# Patient Record
Sex: FEMALE | Race: BLACK OR AFRICAN AMERICAN | HISPANIC OR LATINO
[De-identification: names, ages, dates, MRNs, and addresses within clinical notes are randomized per-mention and may not be internally consistent; named-entity substitution may affect disease eponyms.]

---

## 2021-10-20 PROBLEM — Z00.00 ENCOUNTER FOR PREVENTIVE HEALTH EXAMINATION: Status: ACTIVE | Noted: 2021-10-20

## 2022-03-24 ENCOUNTER — TRANSCRIPTION ENCOUNTER (OUTPATIENT)
Age: 87
End: 2022-03-24

## 2022-08-23 ENCOUNTER — EMERGENCY (EMERGENCY)
Facility: HOSPITAL | Age: 87
LOS: 0 days | Discharge: HOME | End: 2022-08-24
Attending: STUDENT IN AN ORGANIZED HEALTH CARE EDUCATION/TRAINING PROGRAM | Admitting: INTERNAL MEDICINE

## 2022-08-23 VITALS
HEART RATE: 61 BPM | WEIGHT: 153 LBS | SYSTOLIC BLOOD PRESSURE: 129 MMHG | HEIGHT: 66 IN | RESPIRATION RATE: 18 BRPM | DIASTOLIC BLOOD PRESSURE: 78 MMHG | OXYGEN SATURATION: 99 % | TEMPERATURE: 99 F

## 2022-08-23 LAB
ABO RH CONFIRMATION: SIGNIFICANT CHANGE UP
ALBUMIN SERPL ELPH-MCNC: 3.5 G/DL — SIGNIFICANT CHANGE UP (ref 3.3–5)
ALP SERPL-CCNC: 56 U/L — SIGNIFICANT CHANGE UP (ref 40–120)
ALT FLD-CCNC: 19 U/L — SIGNIFICANT CHANGE UP (ref 12–78)
ANION GAP SERPL CALC-SCNC: 8 MMOL/L — SIGNIFICANT CHANGE UP (ref 5–17)
ANISOCYTOSIS BLD QL: SLIGHT — SIGNIFICANT CHANGE UP
APTT BLD: 35.2 SEC — SIGNIFICANT CHANGE UP (ref 27.5–35.5)
AST SERPL-CCNC: 44 U/L — HIGH (ref 15–37)
BASOPHILS # BLD AUTO: 0.02 K/UL — SIGNIFICANT CHANGE UP (ref 0–0.2)
BASOPHILS NFR BLD AUTO: 0.3 % — SIGNIFICANT CHANGE UP (ref 0–2)
BILIRUB SERPL-MCNC: 0.6 MG/DL — SIGNIFICANT CHANGE UP (ref 0.2–1.2)
BLD GP AB SCN SERPL QL: SIGNIFICANT CHANGE UP
BUN SERPL-MCNC: 23 MG/DL — SIGNIFICANT CHANGE UP (ref 7–23)
CALCIUM SERPL-MCNC: 8.9 MG/DL — SIGNIFICANT CHANGE UP (ref 8.5–10.1)
CHLORIDE SERPL-SCNC: 108 MMOL/L — SIGNIFICANT CHANGE UP (ref 96–108)
CO2 SERPL-SCNC: 27 MMOL/L — SIGNIFICANT CHANGE UP (ref 22–31)
CREAT SERPL-MCNC: 1.39 MG/DL — HIGH (ref 0.5–1.3)
EGFR: 36 ML/MIN/1.73M2 — LOW
ELLIPTOCYTES BLD QL SMEAR: SLIGHT — SIGNIFICANT CHANGE UP
EOSINOPHIL # BLD AUTO: 0.02 K/UL — SIGNIFICANT CHANGE UP (ref 0–0.5)
EOSINOPHIL NFR BLD AUTO: 0.3 % — SIGNIFICANT CHANGE UP (ref 0–6)
FLUAV AG NPH QL: SIGNIFICANT CHANGE UP
FLUBV AG NPH QL: SIGNIFICANT CHANGE UP
GLUCOSE BLDC GLUCOMTR-MCNC: 136 MG/DL — HIGH (ref 70–99)
GLUCOSE SERPL-MCNC: 97 MG/DL — SIGNIFICANT CHANGE UP (ref 70–99)
HCT VFR BLD CALC: 22.5 % — LOW (ref 34.5–45)
HGB BLD-MCNC: 6.5 G/DL — CRITICAL LOW (ref 11.5–15.5)
HYPOCHROMIA BLD QL: SIGNIFICANT CHANGE UP
IMM GRANULOCYTES NFR BLD AUTO: 0.5 % — SIGNIFICANT CHANGE UP (ref 0–1.5)
INR BLD: 2.91 RATIO — HIGH (ref 0.88–1.16)
LYMPHOCYTES # BLD AUTO: 1.35 K/UL — SIGNIFICANT CHANGE UP (ref 1–3.3)
LYMPHOCYTES # BLD AUTO: 22.6 % — SIGNIFICANT CHANGE UP (ref 13–44)
MANUAL SMEAR VERIFICATION: YES — SIGNIFICANT CHANGE UP
MCHC RBC-ENTMCNC: 18.7 PG — LOW (ref 27–34)
MCHC RBC-ENTMCNC: 28.9 G/DL — LOW (ref 32–36)
MCV RBC AUTO: 64.8 FL — LOW (ref 80–100)
MONOCYTES # BLD AUTO: 0.79 K/UL — SIGNIFICANT CHANGE UP (ref 0–0.9)
MONOCYTES NFR BLD AUTO: 13.2 % — SIGNIFICANT CHANGE UP (ref 2–14)
NEUTROPHILS # BLD AUTO: 3.77 K/UL — SIGNIFICANT CHANGE UP (ref 1.8–7.4)
NEUTROPHILS NFR BLD AUTO: 63.1 % — SIGNIFICANT CHANGE UP (ref 43–77)
NRBC # BLD: 0 /100 WBCS — SIGNIFICANT CHANGE UP (ref 0–0)
OB PNL STL: POSITIVE
PLAT MORPH BLD: NORMAL — SIGNIFICANT CHANGE UP
PLATELET # BLD AUTO: 220 K/UL — SIGNIFICANT CHANGE UP (ref 150–400)
POTASSIUM SERPL-MCNC: 4.8 MMOL/L — SIGNIFICANT CHANGE UP (ref 3.5–5.3)
POTASSIUM SERPL-SCNC: 4.8 MMOL/L — SIGNIFICANT CHANGE UP (ref 3.5–5.3)
PROT SERPL-MCNC: 7.3 GM/DL — SIGNIFICANT CHANGE UP (ref 6–8.3)
PROTHROM AB SERPL-ACNC: 35.3 SEC — HIGH (ref 10.5–13.4)
RBC # BLD: 3.47 M/UL — LOW (ref 3.8–5.2)
RBC # FLD: 20.2 % — HIGH (ref 10.3–14.5)
RBC BLD AUTO: ABNORMAL
SARS-COV-2 RNA SPEC QL NAA+PROBE: SIGNIFICANT CHANGE UP
SODIUM SERPL-SCNC: 143 MMOL/L — SIGNIFICANT CHANGE UP (ref 135–145)
TARGETS BLD QL SMEAR: SLIGHT — SIGNIFICANT CHANGE UP
WBC # BLD: 5.98 K/UL — SIGNIFICANT CHANGE UP (ref 3.8–10.5)
WBC # FLD AUTO: 5.98 K/UL — SIGNIFICANT CHANGE UP (ref 3.8–10.5)

## 2022-08-23 PROCEDURE — 74176 CT ABD & PELVIS W/O CONTRAST: CPT | Mod: 26,MA

## 2022-08-23 PROCEDURE — 93010 ELECTROCARDIOGRAM REPORT: CPT

## 2022-08-23 PROCEDURE — 71250 CT THORAX DX C-: CPT | Mod: 26

## 2022-08-23 PROCEDURE — 99285 EMERGENCY DEPT VISIT HI MDM: CPT

## 2022-08-23 PROCEDURE — 99223 1ST HOSP IP/OBS HIGH 75: CPT

## 2022-08-23 RX ORDER — ONDANSETRON 8 MG/1
4 TABLET, FILM COATED ORAL EVERY 8 HOURS
Refills: 0 | Status: DISCONTINUED | OUTPATIENT
Start: 2022-08-23 | End: 2022-08-24

## 2022-08-23 RX ORDER — DEXTROSE 50 % IN WATER 50 %
25 SYRINGE (ML) INTRAVENOUS ONCE
Refills: 0 | Status: DISCONTINUED | OUTPATIENT
Start: 2022-08-23 | End: 2022-08-24

## 2022-08-23 RX ORDER — INSULIN LISPRO 100/ML
VIAL (ML) SUBCUTANEOUS
Refills: 0 | Status: DISCONTINUED | OUTPATIENT
Start: 2022-08-23 | End: 2022-08-24

## 2022-08-23 RX ORDER — DEXTROSE 50 % IN WATER 50 %
12.5 SYRINGE (ML) INTRAVENOUS ONCE
Refills: 0 | Status: DISCONTINUED | OUTPATIENT
Start: 2022-08-23 | End: 2022-08-24

## 2022-08-23 RX ORDER — ASPIRIN/CALCIUM CARB/MAGNESIUM 324 MG
1 TABLET ORAL
Qty: 0 | Refills: 0 | DISCHARGE

## 2022-08-23 RX ORDER — METOPROLOL TARTRATE 50 MG
0 TABLET ORAL
Qty: 0 | Refills: 0 | DISCHARGE

## 2022-08-23 RX ORDER — LINAGLIPTIN 5 MG/1
1 TABLET, FILM COATED ORAL
Qty: 0 | Refills: 0 | DISCHARGE

## 2022-08-23 RX ORDER — DEXTROSE 50 % IN WATER 50 %
15 SYRINGE (ML) INTRAVENOUS ONCE
Refills: 0 | Status: DISCONTINUED | OUTPATIENT
Start: 2022-08-23 | End: 2022-08-24

## 2022-08-23 RX ORDER — LANOLIN ALCOHOL/MO/W.PET/CERES
3 CREAM (GRAM) TOPICAL AT BEDTIME
Refills: 0 | Status: DISCONTINUED | OUTPATIENT
Start: 2022-08-23 | End: 2022-08-24

## 2022-08-23 RX ORDER — CINACALCET 30 MG/1
1 TABLET, FILM COATED ORAL
Qty: 0 | Refills: 0 | DISCHARGE

## 2022-08-23 RX ORDER — ASPIRIN/CALCIUM CARB/MAGNESIUM 324 MG
81 TABLET ORAL DAILY
Refills: 0 | Status: DISCONTINUED | OUTPATIENT
Start: 2022-08-24 | End: 2022-08-23

## 2022-08-23 RX ORDER — CINACALCET 30 MG/1
30 TABLET, FILM COATED ORAL DAILY
Refills: 0 | Status: DISCONTINUED | OUTPATIENT
Start: 2022-08-24 | End: 2022-08-24

## 2022-08-23 RX ORDER — MAGNESIUM OXIDE 400 MG ORAL TABLET 241.3 MG
1 TABLET ORAL
Qty: 0 | Refills: 0 | DISCHARGE

## 2022-08-23 RX ORDER — METOPROLOL TARTRATE 50 MG
1 TABLET ORAL
Qty: 0 | Refills: 0 | DISCHARGE

## 2022-08-23 RX ORDER — MAGNESIUM OXIDE 400 MG ORAL TABLET 241.3 MG
400 TABLET ORAL DAILY
Refills: 0 | Status: DISCONTINUED | OUTPATIENT
Start: 2022-08-23 | End: 2022-08-24

## 2022-08-23 RX ORDER — FUROSEMIDE 40 MG
0 TABLET ORAL
Qty: 0 | Refills: 0 | DISCHARGE

## 2022-08-23 RX ORDER — MAGNESIUM OXIDE 400 MG ORAL TABLET 241.3 MG
0 TABLET ORAL
Qty: 0 | Refills: 0 | DISCHARGE

## 2022-08-23 RX ORDER — PANTOPRAZOLE SODIUM 20 MG/1
40 TABLET, DELAYED RELEASE ORAL DAILY
Refills: 0 | Status: DISCONTINUED | OUTPATIENT
Start: 2022-08-23 | End: 2022-08-24

## 2022-08-23 RX ORDER — SODIUM CHLORIDE 9 MG/ML
1000 INJECTION, SOLUTION INTRAVENOUS
Refills: 0 | Status: DISCONTINUED | OUTPATIENT
Start: 2022-08-23 | End: 2022-08-24

## 2022-08-23 RX ORDER — METOPROLOL TARTRATE 50 MG
25 TABLET ORAL
Refills: 0 | Status: DISCONTINUED | OUTPATIENT
Start: 2022-08-23 | End: 2022-08-24

## 2022-08-23 RX ORDER — RIVAROXABAN 15 MG-20MG
15 KIT ORAL
Refills: 0 | Status: DISCONTINUED | OUTPATIENT
Start: 2022-08-23 | End: 2022-08-23

## 2022-08-23 RX ORDER — CINACALCET 30 MG/1
0 TABLET, FILM COATED ORAL
Qty: 0 | Refills: 0 | DISCHARGE

## 2022-08-23 RX ORDER — GLUCAGON INJECTION, SOLUTION 0.5 MG/.1ML
1 INJECTION, SOLUTION SUBCUTANEOUS ONCE
Refills: 0 | Status: DISCONTINUED | OUTPATIENT
Start: 2022-08-23 | End: 2022-08-24

## 2022-08-23 RX ORDER — FONDAPARINUX SODIUM 2.5 MG/.5ML
1 INJECTION, SOLUTION SUBCUTANEOUS
Qty: 0 | Refills: 0 | DISCHARGE

## 2022-08-23 RX ORDER — INSULIN LISPRO 100/ML
VIAL (ML) SUBCUTANEOUS AT BEDTIME
Refills: 0 | Status: DISCONTINUED | OUTPATIENT
Start: 2022-08-23 | End: 2022-08-24

## 2022-08-23 RX ORDER — RIVAROXABAN 15 MG-20MG
0 KIT ORAL
Qty: 0 | Refills: 0 | DISCHARGE

## 2022-08-23 RX ORDER — ACETAMINOPHEN 500 MG
650 TABLET ORAL EVERY 6 HOURS
Refills: 0 | Status: DISCONTINUED | OUTPATIENT
Start: 2022-08-23 | End: 2022-08-24

## 2022-08-23 RX ORDER — LINAGLIPTIN 5 MG/1
0 TABLET, FILM COATED ORAL
Qty: 0 | Refills: 0 | DISCHARGE

## 2022-08-23 NOTE — ED ADULT NURSE NOTE - NSIMPLEMENTINTERV_GEN_ALL_ED
Implemented All Fall with Harm Risk Interventions:  [place] to call system. Call bell, personal items and telephone within reach. Instruct patient to call for assistance. Room bathroom lighting operational. Non-slip footwear when patient is off stretcher. Physically safe environment: no spills, clutter or unnecessary equipment. Stretcher in lowest position, wheels locked, appropriate side rails in place. Provide visual cue, wrist band, yellow gown, etc. Monitor gait and stability. Monitor for mental status changes and reorient to person, place, and time. Review medications for side effects contributing to fall risk. Reinforce activity limits and safety measures with patient and family. Provide visual clues: red socks.

## 2022-08-23 NOTE — ED ADULT NURSE NOTE - OBJECTIVE STATEMENT
as per patient's daughter " she got blood work done yesterday and today it resulted her blood count is low 5.8." [ Denies chest pain, back pain, admits to on and off mild sob at rest]

## 2022-08-23 NOTE — ED PROVIDER NOTE - CONSTITUTIONAL, MLM
Well appearing, awake, alert, oriented to person, place, time/situation, non lethargic appearing normal...

## 2022-08-23 NOTE — H&P ADULT - NSICDXPASTMEDICALHX_GEN_ALL_CORE_FT
PAST MEDICAL HISTORY:  Atrial fibrillation On Xarelto    Diabetes DM-2    H/O thyroid disease     Hypertension     Pacemaker

## 2022-08-23 NOTE — PATIENT PROFILE ADULT - FALL HARM RISK - HARM RISK INTERVENTIONS
Assistance with ambulation/Assistance OOB with selected safe patient handling equipment/Communicate Risk of Fall with Harm to all staff/Discuss with provider need for PT consult/Monitor gait and stability/Provide patient with walking aids - walker, cane, crutches/Reinforce activity limits and safety measures with patient and family/Tailored Fall Risk Interventions/Use of alarms - bed, chair and/or voice tab/Visual Cue: Yellow wristband and red socks/Bed in lowest position, wheels locked, appropriate side rails in place/Call bell, personal items and telephone in reach/Instruct patient to call for assistance before getting out of bed or chair/Non-slip footwear when patient is out of bed/[place] to call system/Physically safe environment - no spills, clutter or unnecessary equipment/Purposeful Proactive Rounding/Room/bathroom lighting operational, light cord in reach

## 2022-08-23 NOTE — H&P ADULT - NSHPLABSRESULTS_GEN_ALL_CORE
LABS:                        6.5    5.98  )-----------( 220      ( 23 Aug 2022 13:00 )             22.5     08-23    143  |  108  |  23  ----------------------------<  97  4.8   |  27  |  1.39<H>    Ca    8.9      23 Aug 2022 13:00    TPro  7.3  /  Alb  3.5  /  TBili  0.6  /  DBili  x   /  AST  44<H>  /  ALT  19  /  AlkPhos  56  08-23    PT/INR - ( 23 Aug 2022 13:00 )   PT: 35.3 sec;   INR: 2.91 ratio         PTT - ( 23 Aug 2022 13:00 )  PTT:35.2 sec.        CT Chest No Cont (08.23.22 @ 17:41) >      IMPRESSION: Minimal patchy ground-glass opacities and interlobular septal   thickening in the right upper lobe are noted. Finding likely represents   mild asymmetric pulmonary edema.    Two nodular opacities are noted in the lingula and the right lower lobe   as described above. Etiology is unclear. Differential diagnosis includes   infection and/or malignancy. Follow-up CT scan is recommended in one   month to differentiate between the two possibilities.            CT Abdomen and Pelvis No Cont (08.23.22 @ 15:35) >    IMPRESSION: Lobular right lower lobe semisolid lesion, worrisome for   malignancy. Recommend dedicated CT of the thorax for further evaluation.    < end of copied text >

## 2022-08-23 NOTE — ED ADULT NURSE NOTE - NSICDXPASTMEDICALHX_GEN_ALL_CORE_FT
PAST MEDICAL HISTORY:  Atrial fibrillation     Diabetes     H/O thyroid disease     Hypertension     Pacemaker

## 2022-08-23 NOTE — PATIENT PROFILE ADULT - NSTRANSFERBELONGINGSRESP_GEN_A_NUR
Duration Of Freeze Thaw-Cycle (Seconds): 5
Post-Care Instructions: I reviewed with the patient in detail post-care instructions. Patient is to wear sunprotection, and avoid picking at any of the treated lesions. Pt may apply Vaseline to crusted or scabbing areas.
Render Post-Care Instructions In Note?: no
Number Of Freeze-Thaw Cycles: 1 freeze-thaw cycle
Detail Level: Detailed
Consent: The patient's consent was obtained including but not limited to risks of crusting, scabbing, blistering, scarring, darker or lighter pigmentary change, recurrence, incomplete removal and infection.
Medical Necessity Information: It is in your best interest to select a reason for this procedure from the list below. All of these items fulfill various CMS LCD requirements except the new and changing color options.
Medical Necessity Clause: This procedure was medically necessary because the lesions that were treated were:
yes

## 2022-08-23 NOTE — H&P ADULT - NSHPPHYSICALEXAM_GEN_ALL_CORE
PHYSICAL EXAM:  GENERAL: NAD, lying in bed comfortably  HEAD:  Atraumatic, Normocephalic  EYES: EOMI, PERRLA, conjunctiva and sclera clear  ENT: Moist mucous membranes  NECK: Supple, No JVD  CHEST/LUNG: Clear to auscultation bilaterally; No rales, rhonchi, wheezing, or rubs. Unlabored respirations  HEART: Regular rate and rhythm; No murmurs, rubs, or gallops  ABDOMEN: Bowel sounds present; Soft, Nontender, Nondistended. No hepatomegaly  EXTREMITIES:  2+ Peripheral Pulses, brisk capillary refill. No clubbing or cyanosis. 1+ pitting edema present.  NERVOUS SYSTEM:  Alert & Oriented X3, speech clear. No deficits   MSK: FROM all 4 extremities, full and equal strength  SKIN: No rashes or lesions

## 2022-08-23 NOTE — ED ADULT NURSE NOTE - ED STAT RN HANDOFF DETAILS
Report given to receiving KEREN Ontiveros from [facility] ,pts history, current condition and reason for admission discussed, safety concerns addressed and reviewed, pt currently in stable condition, IV flushes for patency and site shows no signs or symptoms of infiltrate, dressing is clean dry and intact, pt is aware of plan of care. Awaiting for transport.

## 2022-08-23 NOTE — ED ADULT TRIAGE NOTE - CHIEF COMPLAINT QUOTE
pt sent to ED by pmd for hgb- 5.8, lab done yesterday. pt also c/o intermittent sob started last night. denies sob at this time. hx: afib, htn, chf

## 2022-08-23 NOTE — ED PROVIDER NOTE - OBJECTIVE STATEMENT
89 year old female with h/o HTN, DM, PPM and a-fib (on Xarelto) presents today accompanied with her daughter sent by her PMD for anemia, pt had routine bloodwork done yesterday, pt denies chest pain, dizziness/lightheadedness, pt does reports mild sob at rest, intermittent palpitations

## 2022-08-23 NOTE — H&P ADULT - HISTORY OF PRESENT ILLNESS
Chief Complaint: abnormal lab result with low Hb form PCP.  Patient is 89 year old female with PMH of HTN, DM-2, PPM and A-fib (on Xarelto), CHF and others has been referred to ED by her PCP with low Hb for further evaluation and management. Patient is being accompanied by her daughter. Patient had routine blood work done yesterday with her PCP and her Hb was 6.0. Patient feels tired, week and has exertional dyspnea but denies dizziness/lightheadedness or chest pain. She admits that she had some dark stool lately, but denies seeing blood in her stool or on wipes. She also denies hematemesis or hemoptysis or hematuria. She is on ASA and Xarelto, but not on NSAIDS. She is non-alcoholic.  Hb here in ED is 6.5, and 1 unit PRBC transfusion ordered in ED. Otherwise, she denies fever, chills, vomiting, abdominal pain, diarrhea, constipation or dysuria.    Other significant labs: INR 2.91, MCV 64, RDW 20, Cr 1.39, BUN, 23.    CT Abdomen/Pelvis: unremarkable except lobular 14 x 9 mm RLL semisolid lesion in right lung, worrisome for malignancy.  CT chest: 1.1 cm nodular opacity RLL, f/o CT in 1 month to re-assess.

## 2022-08-23 NOTE — H&P ADULT - ASSESSMENT
Chief Complaint: abnormal lab result with low Hb form PCP.  Patient is 89 year old female with PMH of HTN, DM-2, PPM and A-fib (on Xarelto), CHF and others has been referred to ED by her PCP with low Hb for further evaluation and management. Patient is being accompanied by her daughter. Patient had routine blood work done yesterday with her PCP and her Hb was 6.0. Patient feels tired, week and has exertional dyspnea but denies dizziness/lightheadedness or chest pain. She admits that she had some dark stool lately, but denies seeing blood in her stool or on wipes. She also denies hematemesis or hemoptysis or hematuria. She is on ASA and Xarelto, but not on NSAIDS. She is non-alcoholic.  Hb here in ED is 6.5, and 1 unit PRBC transfusion ordered in ED. Otherwise, she denies fever, chills, vomiting, abdominal pain, diarrhea, constipation or dysuria.    Other significant labs: INR 2.91, MCV 64, RDW 20, Cr 1.39, BUN, 23.    CT Abdomen/Pelvis: unremarkable except lobular 14 x 9 mm RLL semisolid lesion in right lung, worrisome for malignancy.  CT chest: 1.1 cm nodular opacity RLL, f/o CT in 1 month to re-assess.      1. Hypochromic microcytic severe anemia likely upper GI bleed.   She has noted dark stool lately, and her stool is guaiac heme positive. No active ongoing bleeding. CT abd/pelvis unremarkable.  Admit to telemetry.  Iron panel, Ferritin, B12, Folate and CEA level.  Transfuse 1 PRBC which has been ordered in ER.  Check CBC in am.  Protonix 40 mg IV daily.  Hold her ASA and Xarelto for now.  Consult GI in am.    2. RLL 1.1 cm pulmonary nodule per CT chest.  Incidental finding, but worrisome for malignancy.  Follow-up repeat CT chest in 1 month to re-evaluate in co-ordination with her PCP.    3. RON.  Cr 1.39, probably due to GI bleed.  Gentle hydration.  BMP in am.    4. HTN, P.A. fib, PPM in place and h/o CHF.  Stable and no active issue.  Continue her Metoprolol and Lasix.  ASA and Xarelto on hold due to GI bleed.    5. DM-2.  BS level is 97  A1c level.  ISS with coverage.  Accu-checks q AC & HS.    DVT prophylaxis: SCDs.    Plan of care d/w the patient and her daughter at bedside, and they verbalized understanding.           Chief Complaint: abnormal lab result with low Hb form PCP.  Patient is 89 year old female with PMH of HTN, DM-2, PPM and A-fib (on Xarelto), CHF and others has been referred to ED by her PCP with low Hb for further evaluation and management. Patient is being accompanied by her daughter. Patient had routine blood work done yesterday with her PCP and her Hb was 6.0. Patient feels tired, week and has exertional dyspnea but denies dizziness/lightheadedness or chest pain. She admits that she had some dark stool lately, but denies seeing blood in her stool or on wipes. She also denies hematemesis or hemoptysis or hematuria. She is on ASA and Xarelto, but not on NSAIDS. She is non-alcoholic.  Hb here in ED is 6.5, and 1 unit PRBC transfusion ordered in ED. Otherwise, she denies fever, chills, vomiting, abdominal pain, diarrhea, constipation or dysuria.    Other significant labs: INR 2.91, MCV 64, RDW 20, Cr 1.39, BUN, 23.    CT Abdomen/Pelvis: unremarkable except lobular 14 x 9 mm RLL semisolid lesion in right lung, worrisome for malignancy.  CT chest: 1.1 cm nodular opacity RLL, f/o CT in 1 month to re-assess.      1. Hypochromic microcytic severe anemia likely upper GI bleed with iron deficieny.   She has noted dark stool lately, and her stool is guaiac heme positive. No active ongoing bleeding. CT abd/pelvis unremarkable.  Admit to telemetry.  Iron panel, Ferritin, B12, Folate and CEA level.  Transfuse 1 PRBC which has been ordered in ER.  Check CBC in am.  Protonix 40 mg IV daily.  Hold her ASA and Xarelto for now.  Consult GI in am.    2. RLL 1.1 cm pulmonary nodule per CT chest.  Incidental finding, but worrisome for malignancy.  Follow-up repeat CT chest in 1 month to re-evaluate in co-ordination with her PCP.    3. RON.  Cr 1.39, probably due to GI bleed.  Gentle hydration.  BMP in am.    4. HTN, P.A. fib, PPM in place and h/o CHF.  Stable and no active issue.  Continue her Metoprolol and Lasix.  ASA and Xarelto on hold due to GI bleed.    5. DM-2.  BS level is 97  A1c level.  ISS with coverage.  Accu-checks q AC & HS.    6. DVT prophylaxis: SCDs.    Plan of care d/w the patient and her daughter at bedside, and they verbalized understanding.           Chief Complaint: abnormal lab result with low Hb form PCP.  Patient is 89 year old female with PMH of HTN, DM-2, PPM and A-fib (on Xarelto), CHF and others has been referred to ED by her PCP with low Hb for further evaluation and management. Patient is being accompanied by her daughter. Patient had routine blood work done yesterday with her PCP and her Hb was 6.0. Patient feels tired, week and has exertional dyspnea but denies dizziness/lightheadedness or chest pain. She admits that she had some dark stool lately, but denies seeing blood in her stool or on wipes. She also denies hematemesis or hemoptysis or hematuria. She is on ASA and Xarelto, but not on NSAIDS. She is non-alcoholic.  Hb here in ED is 6.5, and 1 unit PRBC transfusion ordered in ED. Otherwise, she denies fever, chills, vomiting, abdominal pain, diarrhea, constipation or dysuria.    Other significant labs: INR 2.91, MCV 64, RDW 20, Cr 1.39, BUN, 23.    CT Abdomen/Pelvis: unremarkable except lobular 14 x 9 mm RLL semisolid lesion in right lung, worrisome for malignancy.  CT chest: 1.1 cm nodular opacity RLL, f/o CT in 1 month to re-assess.      1. Hypochromic microcytic severe anemia likely upper GI bleed with iron deficiency.   She has noted dark stool lately, and her stool is guaiac heme positive. No active ongoing bleeding. CT abd/pelvis unremarkable.  Admit to telemetry.  Iron panel, Ferritin, B12, Folate and CEA level.  Transfuse 1 PRBC which has been ordered in ER.  Check CBC in am.  Protonix 40 mg IV daily.  Hold her ASA and Xarelto for now.  Consult GI in am.    2. RLL 1.1 cm pulmonary nodule per CT chest.  Incidental finding, but worrisome for malignancy.  Follow-up repeat CT chest in 1 month to re-evaluate in co-ordination with her PCP.    3. RON.  Cr 1.39, probably due to GI bleed.  Gentle hydration.  BMP in am.    4. HTN, P.A. fib, PPM in place and h/o CHF.  Stable and no active issue.  Continue her Metoprolol and Lasix.  ASA and Xarelto on hold due to GI bleed.    5. DM-2.  BS level is 97  A1c level.  ISS with coverage.  Accu-checks q AC & HS.    6. DVT prophylaxis: SCDs.    Plan of care d/w the patient and her daughter at bedside, and they verbalized understanding.

## 2022-08-24 ENCOUNTER — TRANSCRIPTION ENCOUNTER (OUTPATIENT)
Age: 87
End: 2022-08-24

## 2022-08-24 VITALS
OXYGEN SATURATION: 98 % | HEART RATE: 62 BPM | TEMPERATURE: 98 F | DIASTOLIC BLOOD PRESSURE: 70 MMHG | RESPIRATION RATE: 18 BRPM | SYSTOLIC BLOOD PRESSURE: 130 MMHG

## 2022-08-24 LAB
A1C WITH ESTIMATED AVERAGE GLUCOSE RESULT: 5.7 % — HIGH (ref 4–5.6)
ANION GAP SERPL CALC-SCNC: 6 MMOL/L — SIGNIFICANT CHANGE UP (ref 5–17)
BUN SERPL-MCNC: 17 MG/DL — SIGNIFICANT CHANGE UP (ref 7–23)
CALCIUM SERPL-MCNC: 8.4 MG/DL — LOW (ref 8.5–10.1)
CEA SERPL-MCNC: 2.2 NG/ML — SIGNIFICANT CHANGE UP (ref 0–3.8)
CHLORIDE SERPL-SCNC: 109 MMOL/L — HIGH (ref 96–108)
CO2 SERPL-SCNC: 29 MMOL/L — SIGNIFICANT CHANGE UP (ref 22–31)
CREAT SERPL-MCNC: 1.16 MG/DL — SIGNIFICANT CHANGE UP (ref 0.5–1.3)
EGFR: 44 ML/MIN/1.73M2 — LOW
ESTIMATED AVERAGE GLUCOSE: 117 MG/DL — HIGH (ref 68–114)
FERRITIN SERPL-MCNC: 36 NG/ML — SIGNIFICANT CHANGE UP (ref 15–150)
FOLATE SERPL-MCNC: >20 NG/ML — SIGNIFICANT CHANGE UP
GLUCOSE BLDC GLUCOMTR-MCNC: 99 MG/DL — SIGNIFICANT CHANGE UP (ref 70–99)
GLUCOSE SERPL-MCNC: 100 MG/DL — HIGH (ref 70–99)
HCT VFR BLD CALC: 30.5 % — LOW (ref 34.5–45)
HGB BLD-MCNC: 9.4 G/DL — LOW (ref 11.5–15.5)
IRON SATN MFR SERPL: 17 UG/DL — LOW (ref 30–160)
IRON SATN MFR SERPL: 4 % — LOW (ref 14–50)
MCHC RBC-ENTMCNC: 21.5 PG — LOW (ref 27–34)
MCHC RBC-ENTMCNC: 30.8 G/DL — LOW (ref 32–36)
MCV RBC AUTO: 69.6 FL — LOW (ref 80–100)
NRBC # BLD: 0 /100 WBCS — SIGNIFICANT CHANGE UP (ref 0–0)
PLATELET # BLD AUTO: 185 K/UL — SIGNIFICANT CHANGE UP (ref 150–400)
POTASSIUM SERPL-MCNC: 4.2 MMOL/L — SIGNIFICANT CHANGE UP (ref 3.5–5.3)
POTASSIUM SERPL-SCNC: 4.2 MMOL/L — SIGNIFICANT CHANGE UP (ref 3.5–5.3)
RBC # BLD: 4.38 M/UL — SIGNIFICANT CHANGE UP (ref 3.8–5.2)
RBC # FLD: 22.8 % — HIGH (ref 10.3–14.5)
SODIUM SERPL-SCNC: 144 MMOL/L — SIGNIFICANT CHANGE UP (ref 135–145)
TIBC SERPL-MCNC: 428 UG/DL — SIGNIFICANT CHANGE UP (ref 220–430)
UIBC SERPL-MCNC: 410 UG/DL — HIGH (ref 110–370)
VIT B12 SERPL-MCNC: 835 PG/ML — SIGNIFICANT CHANGE UP (ref 232–1245)
WBC # BLD: 7.61 K/UL — SIGNIFICANT CHANGE UP (ref 3.8–10.5)
WBC # FLD AUTO: 7.61 K/UL — SIGNIFICANT CHANGE UP (ref 3.8–10.5)

## 2022-08-24 PROCEDURE — 99239 HOSP IP/OBS DSCHRG MGMT >30: CPT

## 2022-08-24 RX ORDER — PANTOPRAZOLE SODIUM 20 MG/1
1 TABLET, DELAYED RELEASE ORAL
Qty: 30 | Refills: 0
Start: 2022-08-24 | End: 2022-09-22

## 2022-08-24 RX ORDER — FERROUS SULFATE 325(65) MG
325 TABLET ORAL DAILY
Refills: 0 | Status: DISCONTINUED | OUTPATIENT
Start: 2022-08-24 | End: 2022-08-24

## 2022-08-24 RX ORDER — FERROUS SULFATE 325(65) MG
1 TABLET ORAL
Qty: 30 | Refills: 0
Start: 2022-08-24 | End: 2022-09-22

## 2022-08-24 RX ADMIN — Medication 325 MILLIGRAM(S): at 13:57

## 2022-08-24 RX ADMIN — PANTOPRAZOLE SODIUM 40 MILLIGRAM(S): 20 TABLET, DELAYED RELEASE ORAL at 11:46

## 2022-08-24 RX ADMIN — Medication 20 MILLIGRAM(S): at 05:51

## 2022-08-24 RX ADMIN — Medication 25 MILLIGRAM(S): at 05:51

## 2022-08-24 RX ADMIN — CINACALCET 30 MILLIGRAM(S): 30 TABLET, FILM COATED ORAL at 11:46

## 2022-08-24 RX ADMIN — Medication 650 MILLIGRAM(S): at 13:57

## 2022-08-24 RX ADMIN — MAGNESIUM OXIDE 400 MG ORAL TABLET 400 MILLIGRAM(S): 241.3 TABLET ORAL at 11:45

## 2022-08-24 NOTE — CONSULT NOTE ADULT - SUBJECTIVE AND OBJECTIVE BOX
HPI:  Chief Complaint: abnormal lab result with low Hb form PCP.  Patient is 89 year old female with PMH of HTN, DM-2, PPM and A-fib (on Xarelto), CHF and others has been referred to ED by her PCP with low Hb for further evaluation and management. Patient is being accompanied by her daughter. Patient had routine blood work done yesterday with her PCP and her Hb was 6.0. Patient feels tired, week and has exertional dyspnea but denies dizziness/lightheadedness or chest pain. She admits that she had some dark stool lately, but denies seeing blood in her stool or on wipes. She also denies hematemesis or hemoptysis or hematuria. She is on ASA and Xarelto, but not on NSAIDS. She is non-alcoholic.  Hb here in ED is 6.5, and 1 unit PRBC transfusion ordered in ED. Otherwise, she denies fever, chills, vomiting, abdominal pain, diarrhea, constipation or dysuria.    Other significant labs: INR 2.91, MCV 64, RDW 20, Cr 1.39, BUN, 23.    CT Abdomen/Pelvis: unremarkable except lobular 14 x 9 mm RLL semisolid lesion in right lung, worrisome for malignancy.  CT chest: 1.1 cm nodular opacity RLL, f/o CT in 1 month to re-assess.   (23 Aug 2022 17:41)  ----- As Above ------ History   Patient presented with severe anemia.       PAST MEDICAL & SURGICAL HISTORY:  Pacemaker      Hypertension      Diabetes  DM-2      Atrial fibrillation  On Xarelto      H/O thyroid disease      No significant past surgical history  PPM          MEDICATIONS  (STANDING):  cinacalcet 30 milliGRAM(s) Oral daily  dextrose 5%. 1000 milliLiter(s) (50 mL/Hr) IV Continuous <Continuous>  dextrose 5%. 1000 milliLiter(s) (100 mL/Hr) IV Continuous <Continuous>  dextrose 50% Injectable 25 Gram(s) IV Push once  dextrose 50% Injectable 12.5 Gram(s) IV Push once  dextrose 50% Injectable 25 Gram(s) IV Push once  glucagon  Injectable 1 milliGRAM(s) IntraMuscular once  insulin lispro (ADMELOG) corrective regimen sliding scale   SubCutaneous three times a day before meals  insulin lispro (ADMELOG) corrective regimen sliding scale   SubCutaneous at bedtime  magnesium oxide 400 milliGRAM(s) Oral daily  metoprolol tartrate 25 milliGRAM(s) Oral two times a day  pantoprazole  Injectable 40 milliGRAM(s) IV Push daily  torsemide 20 milliGRAM(s) Oral daily    MEDICATIONS  (PRN):  acetaminophen     Tablet .. 650 milliGRAM(s) Oral every 6 hours PRN Temp greater or equal to 38C (100.4F), Mild Pain (1 - 3)  aluminum hydroxide/magnesium hydroxide/simethicone Suspension 30 milliLiter(s) Oral every 4 hours PRN Dyspepsia  dextrose Oral Gel 15 Gram(s) Oral once PRN Blood Glucose LESS THAN 70 milliGRAM(s)/deciliter  melatonin 3 milliGRAM(s) Oral at bedtime PRN Insomnia  ondansetron Injectable 4 milliGRAM(s) IV Push every 8 hours PRN Nausea and/or Vomiting      Allergies    No Known Allergies    Intolerances        FAMILY HISTORY:      REVIEW OF SYSTEMS:    CONSTITUTIONAL: No fever, weight loss, or fatigue  EYES: No eye pain, visual disturbances, or discharge  ENMT:  No difficulty hearing, tinnitus, vertigo; No sinus or throat pain  NECK: No pain or stiffness  BREASTS: No pain, masses, or nipple discharge  RESPIRATORY: No cough, wheezing, chills or hemoptysis; No shortness of breath  CARDIOVASCULAR: No chest pain, palpitations, dizziness, or leg swelling  GASTROINTESTINAL: No abdominal or epigastric pain. No nausea, vomiting, or hematemesis; No diarrhea or constipation. No melena or hematochezia.  GENITOURINARY: No dysuria, frequency, hematuria, or incontinence  NEUROLOGICAL: No headaches, memory loss, loss of strength, numbness, or tremors  SKIN: No itching, burning, rashes, or lesions   LYMPH NODES: No enlarged glands  ENDOCRINE: No heat or cold intolerance; No hair loss  MUSCULOSKELETAL: No joint pain or swelling; No muscle, back, or extremity pain  PSYCHIATRIC: No depression, anxiety, mood swings, or difficulty sleeping  HEME/LYMPH: No easy bruising, or bleeding gums  ALLERGY AND IMMUNOLOGIC: No hives or eczema          SOCIAL HISTORY:    FAMILY HISTORY:      Vital Signs Last 24 Hrs  T(C): 36.6 (24 Aug 2022 06:30), Max: 37 (23 Aug 2022 11:51)  T(F): 97.8 (24 Aug 2022 06:30), Max: 98.6 (23 Aug 2022 11:51)  HR: 63 (24 Aug 2022 07:00) (59 - 63)  BP: 149/84 (24 Aug 2022 06:30) (129/78 - 157/71)  BP(mean): 106 (24 Aug 2022 06:30) (95 - 106)  RR: 18 (24 Aug 2022 06:30) (16 - 18)  SpO2: 96% (24 Aug 2022 06:30) (96% - 100%)    Parameters below as of 24 Aug 2022 06:30  Patient On (Oxygen Delivery Method): room air        PHYSICAL EXAM:    GENERAL: NAD, well-groomed, well-developed  HEAD:  Atraumatic, Normocephalic  EYES: EOMI, PERRLA, conjunctiva and sclera clear  ENMT: No tonsillar erythema, exudates, or enlargement; Moist mucous membranes, Good dentition, No lesions  NECK: Supple, No JVD, Normal thyroid  NERVOUS SYSTEM:  Alert & Oriented X3, Good concentration; Motor Strength 5/5 B/L upper and lower extremities; DTRs 2+ intact and symmetric  CHEST/LUNG: Clear to percussion bilaterally; No rales, rhonchi, wheezing, or rubs  HEART: Regular rate and rhythm; No murmurs, rubs, or gallops  ABDOMEN: Soft, Nontender, Nondistended; Bowel sounds present  EXTREMITIES:  2+ Peripheral Pulses, No clubbing, cyanosis, or edema  LYMPH: No lymphadenopathy noted   RECTAL: No masses, prostate normal size and smooth, Guaiaci negative   BREAST: No palpable masses, skin no lesions no retractions, no discharges. adnexal no palpable masses noted   GYN: uterus normal size, adnexal, no palpable masses, no CMT, no uterine discharge   SKIN: No rashes or lesions    LABS:                        9.4    7.61  )-----------( 185      ( 24 Aug 2022 07:55 )             30.5       CBC:  08-24 @ 07:55  WBC  7.61  HGB 9.4  HCT 30.5 Plate 185  MCV 69.6  08-23 @ 13:00  WBC  5.98  HGB 6.5  HCT 22.5 Plate 220  MCV 64.8           24 Aug 2022 07:55    144    |  109    |  17     ----------------------------<  100    4.2     |  29     |  1.16   23 Aug 2022 13:00    143    |  108    |  23     ----------------------------<  97     4.8     |  27     |  1.39     Ca    8.4        24 Aug 2022 07:55  Ca    8.9        23 Aug 2022 13:00    TPro  7.3    /  Alb  3.5    /  TBili  0.6    /  DBili  x      /  AST  44     /  ALT  19     /  AlkPhos  56     23 Aug 2022 13:00    PT/INR - ( 23 Aug 2022 13:00 )   PT: 35.3 sec;   INR: 2.91 ratio         PTT - ( 23 Aug 2022 13:00 )  PTT:35.2 sec        RADIOLOGY & ADDITIONAL STUDIES: HPI:  Chief Complaint: abnormal lab result with low Hb form PCP.  Patient is 89 year old female with PMH of HTN, DM-2, PPM and A-fib (on Xarelto), CHF and others has been referred to ED by her PCP with low Hb for further evaluation and management. Patient is being accompanied by her daughter. Patient had routine blood work done yesterday with her PCP and her Hb was 6.0. Patient feels tired, week and has exertional dyspnea but denies dizziness/lightheadedness or chest pain. She admits that she had some dark stool lately, but denies seeing blood in her stool or on wipes. She also denies hematemesis or hemoptysis or hematuria. She is on ASA and Xarelto, but not on NSAIDS. She is non-alcoholic.  Hb here in ED is 6.5, and 1 unit PRBC transfusion ordered in ED. Otherwise, she denies fever, chills, vomiting, abdominal pain, diarrhea, constipation or dysuria.    Other significant labs: INR 2.91, MCV 64, RDW 20, Cr 1.39, BUN, 23.    CT Abdomen/Pelvis: unremarkable except lobular 14 x 9 mm RLL semisolid lesion in right lung, worrisome for malignancy.  CT chest: 1.1 cm nodular opacity RLL, f/o CT in 1 month to re-assess.   (23 Aug 2022 17:41)  ----- As Above ------ History   Patient presented with severe anemia. ( HGB 6.5 ) She is on ASA / Xarelto. The patient denies melena, hematochezia, hematemesis, nausea, vomiting, abdominal pain, constipation, diarrhea, or change in bowel movements. Heme (+) stool on admission.   The patient's HGB is always low ( ~ 10 , Sickle cell trait ) However, in February of this year, her HGB was 8. She was in [facility] at that time when she had her peacemaker inserted. She had an EGD ( negative) and stool tests. The HGB sanjana to 9 and she was discharged. Blood work in March revealed a HGB ~ 9. Unfortunately, a follow HGB done recently at her PMD was ~6.   The patient had a colonoscopy 3 years ago that revealed a large polyp at the hepatic flexure which was EVENTUALLY removed. Two small polyps were left. Patient was instructed to repeat it in 1 year but never did.   Of note, patient is scheduled for a pacemaker check this Monday.      PAST MEDICAL & SURGICAL HISTORY:  Pacemaker      Hypertension      Diabetes  DM-2      Atrial fibrillation  On Xarelto      H/O thyroid disease      No significant past surgical history  PPM          MEDICATIONS  (STANDING):  cinacalcet 30 milliGRAM(s) Oral daily  dextrose 5%. 1000 milliLiter(s) (50 mL/Hr) IV Continuous <Continuous>  dextrose 5%. 1000 milliLiter(s) (100 mL/Hr) IV Continuous <Continuous>  dextrose 50% Injectable 25 Gram(s) IV Push once  dextrose 50% Injectable 12.5 Gram(s) IV Push once  dextrose 50% Injectable 25 Gram(s) IV Push once  glucagon  Injectable 1 milliGRAM(s) IntraMuscular once  insulin lispro (ADMELOG) corrective regimen sliding scale   SubCutaneous three times a day before meals  insulin lispro (ADMELOG) corrective regimen sliding scale   SubCutaneous at bedtime  magnesium oxide 400 milliGRAM(s) Oral daily  metoprolol tartrate 25 milliGRAM(s) Oral two times a day  pantoprazole  Injectable 40 milliGRAM(s) IV Push daily  torsemide 20 milliGRAM(s) Oral daily    MEDICATIONS  (PRN):  acetaminophen     Tablet .. 650 milliGRAM(s) Oral every 6 hours PRN Temp greater or equal to 38C (100.4F), Mild Pain (1 - 3)  aluminum hydroxide/magnesium hydroxide/simethicone Suspension 30 milliLiter(s) Oral every 4 hours PRN Dyspepsia  dextrose Oral Gel 15 Gram(s) Oral once PRN Blood Glucose LESS THAN 70 milliGRAM(s)/deciliter  melatonin 3 milliGRAM(s) Oral at bedtime PRN Insomnia  ondansetron Injectable 4 milliGRAM(s) IV Push every 8 hours PRN Nausea and/or Vomiting      Allergies    No Known Allergies    Intolerances        FAMILY HISTORY:      REVIEW OF SYSTEMS:    CONSTITUTIONAL: No fever, weight loss,   EYES: No eye pain, visual disturbances, or discharge  ENMT:  No difficulty hearing, tinnitus, vertigo; No sinus or throat pain  NECK: No pain or stiffness  BREASTS: No pain, masses, or nipple discharge  RESPIRATORY: No cough, wheezing, chills or hemoptysis; No shortness of breath  CARDIOVASCULAR: No chest pain, palpitations, dizziness, or leg swelling  GASTROINTESTINAL: See above   GENITOURINARY: No dysuria, frequency, hematuria, or incontinence  NEUROLOGICAL: No headaches, memory loss, loss of strength, numbness, or tremors  SKIN: No itching, burning, rashes, or lesions   LYMPH NODES: No enlarged glands  ENDOCRINE: No heat or cold intolerance; No hair loss  MUSCULOSKELETAL: No joint pain or swelling; No muscle, back, or extremity pain  PSYCHIATRIC: No depression, anxiety, mood swings, or difficulty sleeping  HEME/LYMPH: No easy bruising, or bleeding gums  ALLERGY AND IMMUNOLOGIC: No hives or eczema          SOCIAL HISTORY:    FAMILY HISTORY:      Vital Signs Last 24 Hrs  T(C): 36.6 (24 Aug 2022 06:30), Max: 37 (23 Aug 2022 11:51)  T(F): 97.8 (24 Aug 2022 06:30), Max: 98.6 (23 Aug 2022 11:51)  HR: 63 (24 Aug 2022 07:00) (59 - 63)  BP: 149/84 (24 Aug 2022 06:30) (129/78 - 157/71)  BP(mean): 106 (24 Aug 2022 06:30) (95 - 106)  RR: 18 (24 Aug 2022 06:30) (16 - 18)  SpO2: 96% (24 Aug 2022 06:30) (96% - 100%)    Parameters below as of 24 Aug 2022 06:30  Patient On (Oxygen Delivery Method): room air        PHYSICAL EXAM:    GENERAL: NAD, well-groomed, well-developed  HEAD:  Atraumatic, Normocephalic  EYES: EOMI, PERRLA, conjunctiva and sclera clear  NECK: Supple, No JVD, Normal thyroid  NERVOUS SYSTEM:  Alert & Oriented X3, Good concentration; Motor Strength 5/5 B/L upper and lower extremities;   CHEST/LUNG: Clear to percussion bilaterally; No rales, rhonchi, wheezing, or rubs  HEART: Regular rate and rhythm; No murmurs, rubs, or gallops  ABDOMEN: Soft, Nontender, Nondistended; Bowel sounds present  EXTREMITIES:  2+ Peripheral Pulses, No clubbing, cyanosis, or edema  LYMPH: No lymphadenopathy noted   RECTAL: No masses, Small amount of multiple soft , brown stool balls.   SKIN: No rashes or lesions    LABS:                        9.4    7.61  )-----------( 185      ( 24 Aug 2022 07:55 )             30.5       CBC:  08-24 @ 07:55  WBC  7.61  HGB 9.4  HCT 30.5 Plate 185  MCV 69.6  08-23 @ 13:00  WBC  5.98  HGB 6.5  HCT 22.5 Plate 220  MCV 64.8           24 Aug 2022 07:55    144    |  109    |  17     ----------------------------<  100    4.2     |  29     |  1.16   23 Aug 2022 13:00    143    |  108    |  23     ----------------------------<  97     4.8     |  27     |  1.39     Ca    8.4        24 Aug 2022 07:55  Ca    8.9        23 Aug 2022 13:00    TPro  7.3    /  Alb  3.5    /  TBili  0.6    /  DBili  x      /  AST  44     /  ALT  19     /  AlkPhos  56     23 Aug 2022 13:00    PT/INR - ( 23 Aug 2022 13:00 )   PT: 35.3 sec;   INR: 2.91 ratio         PTT - ( 23 Aug 2022 13:00 )  PTT:35.2 sec        RADIOLOGY & ADDITIONAL STUDIES:

## 2022-08-24 NOTE — DISCHARGE NOTE NURSING/CASE MANAGEMENT/SOCIAL WORK - PATIENT PORTAL LINK FT
You can access the FollowMyHealth Patient Portal offered by [facility] by registering at the following website: [email or web address]. By joining [employer]’s FollowMyHealth portal, you will also be able to view your health information using other applications (apps) compatible with our system.

## 2022-08-24 NOTE — CONSULT NOTE ADULT - ASSESSMENT
HPI:  Chief Complaint: abnormal lab result with low Hb form PCP.  Patient is 89 year old female with PMH of HTN, DM-2, PPM and A-fib (on Xarelto), CHF and others has been referred to ED by her PCP with low Hb for further evaluation and management. Patient is being accompanied by her daughter. Patient had routine blood work done yesterday with her PCP and her Hb was 6.0. Patient feels tired, week and has exertional dyspnea but denies dizziness/lightheadedness or chest pain. She admits that she had some dark stool lately, but denies seeing blood in her stool or on wipes. She also denies hematemesis or hemoptysis or hematuria. She is on ASA and Xarelto, but not on NSAIDS. She is non-alcoholic.  Hb here in ED is 6.5, and 1 unit PRBC transfusion ordered in ED. Otherwise, she denies fever, chills, vomiting, abdominal pain, diarrhea, constipation or dysuria.    Other significant labs: INR 2.91, MCV 64, RDW 20, Cr 1.39, BUN, 23.    CT Abdomen/Pelvis: unremarkable except lobular 14 x 9 mm RLL semisolid lesion in right lung, worrisome for malignancy.  CT chest: 1.1 cm nodular opacity RLL, f/o CT in 1 month to re-assess.   (23 Aug 2022 17:41)  ----- As Above ------ History   Patient presented with severe anemia. ( HGB 6.5 ) She is on ASA / Xarelto. The patient denies melena, hematochezia, hematemesis, nausea, vomiting, abdominal pain, constipation, diarrhea, or change in bowel movements. Heme (+) stool on admission.   The patient's HGB is always low ( ~ 10 , Sickle cell trait ) However, in February of this year, her HGB was 8. She was in [facility] at that time when she had her peacemaker inserted. She had an EGD ( negative) and stool tests. The HGB sanjana to 9 and she was discharged. Blood work in March revealed a HGB ~ 9. Unfortunately, a follow HGB done recently at her PMD was ~6.   The patient had a colonoscopy 3 years ago that revealed a large polyp at the hepatic flexure which was EVENTUALLY removed. Two small polyps were left. Patient was instructed to repeat it in 1 year but never did.   Of note, patient is scheduled for a pacemaker check this Monday.    Severe anemia - Patient w/o any GI symptoms. Iron sat low but normal ferritin. History of a large polyp removed three  years ago and two small ones not removed. Patient recommended to repeat in one year. S/P two un HPI:  Chief Complaint: abnormal lab result with low Hb form PCP.  Patient is 89 year old female with PMH of HTN, DM-2, PPM and A-fib (on Xarelto), CHF and others has been referred to ED by her PCP with low Hb for further evaluation and management. Patient is being accompanied by her daughter. Patient had routine blood work done yesterday with her PCP and her Hb was 6.0. Patient feels tired, week and has exertional dyspnea but denies dizziness/lightheadedness or chest pain. She admits that she had some dark stool lately, but denies seeing blood in her stool or on wipes. She also denies hematemesis or hemoptysis or hematuria. She is on ASA and Xarelto, but not on NSAIDS. She is non-alcoholic.  Hb here in ED is 6.5, and 1 unit PRBC transfusion ordered in ED. Otherwise, she denies fever, chills, vomiting, abdominal pain, diarrhea, constipation or dysuria.    Other significant labs: INR 2.91, MCV 64, RDW 20, Cr 1.39, BUN, 23.    CT Abdomen/Pelvis: unremarkable except lobular 14 x 9 mm RLL semisolid lesion in right lung, worrisome for malignancy.  CT chest: 1.1 cm nodular opacity RLL, f/o CT in 1 month to re-assess.   (23 Aug 2022 17:41)  ----- As Above ------ History   Patient presented with severe anemia. ( HGB 6.5 ) She is on ASA / Xarelto. The patient denies melena, hematochezia, hematemesis, nausea, vomiting, abdominal pain, constipation, diarrhea, or change in bowel movements. Heme (+) stool on admission.   The patient's HGB is always low ( ~ 10 , Sickle cell trait ) However, in February of this year, her HGB was 8. She was in [facility] at that time when she had her peacemaker inserted. She had an EGD ( negative) and stool tests. The HGB sanjana to 9 and she was discharged. Blood work in March revealed a HGB ~ 9. Unfortunately, a follow HGB done recently at her PMD was ~6.   The patient had a colonoscopy 3 years ago that revealed a large polyp at the hepatic flexure which was EVENTUALLY removed. Two small polyps were left. Patient was instructed to repeat it in 1 year but never did.   Of note, patient is scheduled for a pacemaker check this Monday.    Severe anemia - Patient w/o any GI symptoms. Tolerating solid diet.  Iron sat low but normal ferritin. History of a large polyp removed three  years ago and two small ones not removed. Patient recommended to repeat in one year. S/P two units PRBC .Brown stool, heme (+), was on Xarelto / ASA ----- No signs of acute GI bleeding. No need of emergent colonoscopy at the present time. Will need pacemaker interrogated ( Scheduled for this Monday ) -   ====== Discussed with daughter for 10 minutes. She understands that if the colonoscopy is not done in [facility], she will see the GI specialist who did her last colonoscopy.

## 2022-08-24 NOTE — DISCHARGE NOTE PROVIDER - NSDCCPCAREPLAN_GEN_ALL_CORE_FT
PRINCIPAL DISCHARGE DIAGNOSIS  Diagnosis: Severe anemia  Assessment and Plan of Treatment: Iron deficiency, anemia   seen by gi, brown stool  you got   1unit of blood   iron low, start iron supplement   can have [facility] gi work up as outpatient         SECONDARY DISCHARGE DIAGNOSES  Diagnosis: Lung mass  Assessment and Plan of Treatment: RLL 1.1 cm pulmonary nodule per CT chest.  Incidental finding  Follow-up repeat CT chest in 1 month to re-evaluate in co-ordination with your PCP.

## 2022-08-24 NOTE — DISCHARGE NOTE PROVIDER - HOSPITAL COURSE
89 year old female with PMH of HTN, DM-2, PPM and A-fib (on Xarelto), CHF and others has been referred to ED by her PCP with low Hb for further evaluation and management. Patient is being accompanied by her daughter. Patient had routine blood work done yesterday with her PCP and her Hb was 6.0. Patient feels tired, week and has exertional dyspnea but denies dizziness/lightheadedness or chest pain. She admits that she had some dark stool lately, but denies seeing blood in her stool or on wipes. She also denies hematemesis or hemoptysis or hematuria. She is on ASA and Xarelto, but not on NSAIDS. She is non-alcoholic.  Hb here in ED is 6.5, and 1 unit PRBC transfusion ordered in ED. Otherwise, she denies fever, chills, vomiting, abdominal pain, diarrhea, constipation or dysuria.    Other significant labs: INR 2.91, MCV 64, RDW 20, Cr 1.39, BUN, 23.    CT Abdomen/Pelvis: unremarkable except lobular 14 x 9 mm RLL semisolid lesion in right lung, worrisome for malignancy.  CT chest: 1.1 cm nodular opacity RLL, f/o CT in 1 month to re-assess.      1. Hypochromic microcytic severe anemia  Iron deficiency,   seen by gi, brown stool  good response to prbc  iron low, start ferrous sulfate  can have [facility] gi work up as outpatient     2. RLL 1.1 cm pulmonary nodule per CT chest.  Incidental finding, but worrisome for malignancy.  Follow-up repeat CT chest in 1 month to re-evaluate in co-ordination with her PCP.    3. RON.  resolved     4. HTN, P.A. fib, PPM in place and h/o CHF.  Stable and no active issue.  Continue her Metoprolol and Lasix.  ASA and Xarelto resumed, as no evidence of shannan bleeding, repeat cbc in 5-7d    5. DM-2.  c/w home meds      pt seen and examined 45 min spent on dc planning     Lab test review, Radiology Review, Vitals review, Consultant review and discussion, Physical examination, IDR, Assessment and plan; Plan discussion with patient and family

## 2022-08-24 NOTE — DISCHARGE NOTE PROVIDER - NSDCMRMEDTOKEN_GEN_ALL_CORE_FT
aspirin 81 mg oral tablet, chewable: 1 tab(s) orally once a day  cinacalcet 30 mg oral tablet: 1 tab(s) orally once a day  ferrous sulfate 325 mg (65 mg elemental iron) oral tablet: 1 tab(s) orally once a day  magnesium oxide 400 mg oral tablet: 1 tab(s) orally once a day  Metoprolol Tartrate 25 mg oral tablet: 1 tab(s) orally 2 times a day  Protonix 40 mg oral delayed release tablet: 1 tab(s) orally once a day   torsemide 20 mg oral tablet: 2 tab(s) orally once a day  Tradjenta 5 mg oral tablet: 1 tab(s) orally once a day  Xarelto 15 mg oral tablet: 1 tab(s) orally once a day (in the evening)

## 2022-08-24 NOTE — DISCHARGE NOTE NURSING/CASE MANAGEMENT/SOCIAL WORK - NSDCPEFALRISK_GEN_ALL_CORE
For information on Fall & Injury Prevention, visit: [email or web address] OR  [email or web address] OR  https://www.cdc.gov/steadi/patient.html

## 2022-08-25 ENCOUNTER — TRANSCRIPTION ENCOUNTER (OUTPATIENT)
Age: 87
End: 2022-08-25

## 2022-08-30 DIAGNOSIS — D64.9 ANEMIA, UNSPECIFIED: ICD-10-CM

## 2022-08-30 DIAGNOSIS — Z95.0 PRESENCE OF CARDIAC PACEMAKER: ICD-10-CM

## 2022-08-30 DIAGNOSIS — I48.91 UNSPECIFIED ATRIAL FIBRILLATION: ICD-10-CM

## 2022-08-30 DIAGNOSIS — Z79.4 LONG TERM (CURRENT) USE OF INSULIN: ICD-10-CM

## 2022-08-30 DIAGNOSIS — E07.9 DISORDER OF THYROID, UNSPECIFIED: ICD-10-CM

## 2022-08-30 DIAGNOSIS — I50.9 HEART FAILURE, UNSPECIFIED: ICD-10-CM

## 2022-08-30 DIAGNOSIS — Z79.82 LONG TERM (CURRENT) USE OF ASPIRIN: ICD-10-CM

## 2022-08-30 DIAGNOSIS — I11.0 HYPERTENSIVE HEART DISEASE WITH HEART FAILURE: ICD-10-CM

## 2022-08-30 DIAGNOSIS — R00.2 PALPITATIONS: ICD-10-CM

## 2022-08-30 DIAGNOSIS — E11.9 TYPE 2 DIABETES MELLITUS WITHOUT COMPLICATIONS: ICD-10-CM

## 2022-08-30 DIAGNOSIS — R06.02 SHORTNESS OF BREATH: ICD-10-CM

## 2022-08-30 DIAGNOSIS — Z20.822 CONTACT WITH AND (SUSPECTED) EXPOSURE TO COVID-19: ICD-10-CM

## 2022-08-30 DIAGNOSIS — Z79.01 LONG TERM (CURRENT) USE OF ANTICOAGULANTS: ICD-10-CM

## 2022-08-30 DIAGNOSIS — R91.8 OTHER NONSPECIFIC ABNORMAL FINDING OF LUNG FIELD: ICD-10-CM

## 2024-02-02 NOTE — PROVIDER CONTACT NOTE (CRITICAL VALUE NOTIFICATION) - PERSON GIVING RESULT:
FUTURE VISIT INFORMATION      FUTURE VISIT INFORMATION:  Date: 4/2/24  Time: 10:00am  Location: [facility]  REFERRAL INFORMATION:  Referring providers clinic:  self referred  Reason for visit/diagnosis  breast reduction consult     RECORDS REQUESTED FROM:       No recs to collect per pt    Varghese